# Patient Record
Sex: FEMALE | Race: WHITE | NOT HISPANIC OR LATINO | URBAN - METROPOLITAN AREA
[De-identification: names, ages, dates, MRNs, and addresses within clinical notes are randomized per-mention and may not be internally consistent; named-entity substitution may affect disease eponyms.]

---

## 2019-12-03 ENCOUNTER — HOSPITAL ENCOUNTER (EMERGENCY)
Facility: HOSPITAL | Age: 39
Discharge: HOME | End: 2019-12-03
Attending: EMERGENCY MEDICINE
Payer: MEDICAID

## 2019-12-03 VITALS
SYSTOLIC BLOOD PRESSURE: 108 MMHG | HEART RATE: 78 BPM | TEMPERATURE: 97.6 F | DIASTOLIC BLOOD PRESSURE: 54 MMHG | RESPIRATION RATE: 18 BRPM | OXYGEN SATURATION: 98 %

## 2019-12-03 DIAGNOSIS — R10.9 ABDOMINAL PAIN: Primary | ICD-10-CM

## 2019-12-03 DIAGNOSIS — Z34.90 PREGNANCY: ICD-10-CM

## 2019-12-03 PROCEDURE — 99283 EMERGENCY DEPT VISIT LOW MDM: CPT

## 2019-12-03 PROCEDURE — 99283 EMERGENCY DEPT VISIT LOW MDM: CPT | Performed by: EMERGENCY MEDICINE

## 2019-12-03 NOTE — ED PROVIDER NOTES
History  Chief Complaint   Patient presents with    Abdominal Pain Pregnant     Patient c/o abdominal pain that started 1 week ago  along with contractions  Patient denies any bleeding  Pt comes to ED c/o 2 days of abdominal pain and contractions  She is around 30 weeks pregnant  She is from [place] and follows with OB there  She denies loss of fluid  She denies vaginal bleeding  She denies syncope, weakness, and presyncope  She denies HA, blurry vision, and epigastric pain  None       History reviewed  No pertinent past medical history  History reviewed  No pertinent surgical history  History reviewed  No pertinent family history  I have reviewed and agree with the history as documented  Social History     Tobacco Use    Smoking status: Never Smoker    Smokeless tobacco: Never Used   Substance Use Topics    Alcohol use: Never     Frequency: Never    Drug use: Not on file        Review of Systems   Gastrointestinal: Positive for abdominal pain  All other systems reviewed and are negative  Physical Exam  Physical Exam   Constitutional: She is oriented to person, place, and time  She appears well-developed and well-nourished  No distress  HENT:   Head: Normocephalic and atraumatic  Eyes: Pupils are equal, round, and reactive to light  Conjunctivae and EOM are normal    Neck: Normal range of motion  Neck supple  No JVD present  Cardiovascular: Normal rate, regular rhythm, normal heart sounds and intact distal pulses  Pulmonary/Chest: Effort normal and breath sounds normal  No stridor  Abdominal: Soft  She exhibits distension  There is no tenderness  There is no rebound and no guarding  Gravid uterus  No focal tenderness  Musculoskeletal: Normal range of motion  She exhibits no edema, tenderness or deformity  Neurological: She is alert and oriented to person, place, and time  No cranial nerve deficit or sensory deficit  She exhibits normal muscle tone   Coordination normal    Skin: Skin is warm and dry  Capillary refill takes less than 2 seconds  No rash noted  She is not diaphoretic  No erythema  No pallor  Nursing note and vitals reviewed  Vital Signs  ED Triage Vitals [12/03/19 1837]   Temperature Pulse Respirations Blood Pressure SpO2   97 6 °F (36 4 °C) 78 18 108/54 98 %      Temp Source Heart Rate Source Patient Position - Orthostatic VS BP Location FiO2 (%)   Oral Monitor Sitting Left arm --      Pain Score       --           Vitals:    12/03/19 1837   BP: 108/54   Pulse: 78   Patient Position - Orthostatic VS: Sitting         Visual Acuity      ED Medications  Medications - No data to display    Diagnostic Studies  Results Reviewed     None                 No orders to display              Procedures  Procedures         ED Course  ED Course as of Dec 03 1939   Tue Dec 03, 2019   1858 6:58 PM spoke w ED staff at [facility] and advised that pt has declined transfer and will be coming to their ED via POV  MDM  Number of Diagnoses or Management Options  Abdominal pain:   Pregnancy:   Diagnosis management comments: Pt with abdominal pain and contractions without evidence of impending delivery  No loss of fluid  No vaginal bleeding  I offered her transfer to facility with OB but  and pt prefer to drive themselves and provide informed refusal of transfer  I called [facility] and advised them that pt will be driving there to the ED           Disposition  Final diagnoses:   Abdominal pain   Pregnancy     Time reflects when diagnosis was documented in both MDM as applicable and the Disposition within this note     Time User Action Codes Description Comment    12/3/2019  6:49 PM Stanislav Bingham Add [R10 9] Abdominal pain     12/3/2019  6:50 PM Stanislav Bingham Add [F63 46] Pregnancy       ED Disposition     ED Disposition Condition Date/Time Comment    Discharge Stable Tue Dec 3, 2019  6:49  [address] discharge to home/self care  Follow-up Information     Follow up With Specialties Details Why [address] Emergency Medicine  go now  [address] [id number]            There are no discharge medications for this patient  No discharge procedures on file      ED Provider  Electronically Signed by           Shahram Sharma MD  12/03/19 6026